# Patient Record
(demographics unavailable — no encounter records)

---

## 2025-01-20 NOTE — HEALTH RISK ASSESSMENT
[Monthly or less (1 pt)] : Monthly or less (1 point) [1 or 2 (0 pts)] : 1 or 2 (0 points) [Never (0 pts)] : Never (0 points) [Never] : Never [HIV test declined] : HIV test declined [Hepatitis C test declined] : Hepatitis C test declined [With Family] : lives with family [Feels Safe at Home] : Feels safe at home [Fully functional (bathing, dressing, toileting, transferring, walking, feeding)] : Fully functional (bathing, dressing, toileting, transferring, walking, feeding) [Fully functional (using the telephone, shopping, preparing meals, housekeeping, doing laundry, using] : Fully functional and needs no help or supervision to perform IADLs (using the telephone, shopping, preparing meals, housekeeping, doing laundry, using transportation, managing medications and managing finances) [Good] : ~his/her~  mood as  good [Yes] : In the past 12 months have you used drugs other than those required for medical reasons? Yes [0] : 2) Feeling down, depressed, or hopeless: Not at all (0) [PHQ-2 Negative - No further assessment needed] : PHQ-2 Negative - No further assessment needed [FreeTextEntry1] : ^ see above  [de-identified] : none [de-identified] : none [Audit-CScore] : 1 [de-identified] : marijuana  [de-identified] : currently is not  [de-identified] : balanced; supplements- coq 10, fish oil, vitamin D, garlic extract  [[id number]] : 0 [Change in mental status noted] : No change in mental status noted [Language] : denies difficulty with language [Sexually Active] : not sexually active [High Risk Behavior] : no high risk behavior [Reports changes in hearing] : Reports no changes in hearing [Reports changes in vision] : Reports no changes in vision [Reports changes in dental health] : Reports no changes in dental health [de-identified] : mother, aunt, sister, and uncle  [de-identified] : graduated in dec, looking for jobs  [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

## 2025-01-20 NOTE — HISTORY OF PRESENT ILLNESS
[FreeTextEntry1] : CPE [de-identified] : 24 yo m, pmhx of anxiety and depression, HTN, here for CPE chronic low mood and anxious  would like to psychiatrist  travon passed in july from shingles  denies any si/hi  support system- family  denies any other associated symptoms   - ran out of bp medications a few months ago  started again last month denies any other associated symptoms denies any other complaints or concerns at this time

## 2025-01-20 NOTE — HEALTH RISK ASSESSMENT
[Monthly or less (1 pt)] : Monthly or less (1 point) [1 or 2 (0 pts)] : 1 or 2 (0 points) [Never (0 pts)] : Never (0 points) [Never] : Never [HIV test declined] : HIV test declined [Hepatitis C test declined] : Hepatitis C test declined [With Family] : lives with family [Feels Safe at Home] : Feels safe at home [Fully functional (bathing, dressing, toileting, transferring, walking, feeding)] : Fully functional (bathing, dressing, toileting, transferring, walking, feeding) [Fully functional (using the telephone, shopping, preparing meals, housekeeping, doing laundry, using] : Fully functional and needs no help or supervision to perform IADLs (using the telephone, shopping, preparing meals, housekeeping, doing laundry, using transportation, managing medications and managing finances) [Good] : ~his/her~  mood as  good [Yes] : In the past 12 months have you used drugs other than those required for medical reasons? Yes [0] : 2) Feeling down, depressed, or hopeless: Not at all (0) [PHQ-2 Negative - No further assessment needed] : PHQ-2 Negative - No further assessment needed [FreeTextEntry1] : ^ see above  [de-identified] : none [de-identified] : none [Audit-CScore] : 1 [de-identified] : marijuana  [de-identified] : currently is not  [de-identified] : balanced; supplements- coq 10, fish oil, vitamin D, garlic extract  [[id number]] : 0 [Change in mental status noted] : No change in mental status noted [Language] : denies difficulty with language [Sexually Active] : not sexually active [High Risk Behavior] : no high risk behavior [Reports changes in hearing] : Reports no changes in hearing [Reports changes in vision] : Reports no changes in vision [Reports changes in dental health] : Reports no changes in dental health [de-identified] : mother, aunt, sister, and uncle  [de-identified] : graduated in dec, looking for jobs  [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

## 2025-01-20 NOTE — HISTORY OF PRESENT ILLNESS
[FreeTextEntry1] : CPE [de-identified] : 24 yo m, pmhx of anxiety and depression, HTN, here for CPE chronic low mood and anxious  would like to psychiatrist  travon passed in july from shingles  denies any si/hi  support system- family  denies any other associated symptoms   - ran out of bp medications a few months ago  started again last month denies any other associated symptoms denies any other complaints or concerns at this time

## 2025-01-20 NOTE — ASSESSMENT
[FreeTextEntry1] : Routine medical examination VSS- exam normal  referred as above Advised healthy diet and exercise reviewed labs with patient follow up in 2 weeks  patient verbalizes understanding and patient is stable upon discharge